# Patient Record
Sex: MALE | Race: WHITE | HISPANIC OR LATINO | Employment: OTHER | URBAN - METROPOLITAN AREA
[De-identification: names, ages, dates, MRNs, and addresses within clinical notes are randomized per-mention and may not be internally consistent; named-entity substitution may affect disease eponyms.]

---

## 2019-01-11 ENCOUNTER — TELEPHONE (OUTPATIENT)
Dept: GASTROENTEROLOGY | Facility: CLINIC | Age: 69
End: 2019-01-11

## 2019-01-11 NOTE — TELEPHONE ENCOUNTER
Spoke with the patient at the  informed we will not be able to see him due to having populytics ins through v netowrk through there kidney transplant program

## 2019-08-23 DIAGNOSIS — K64.9 HEMORRHOIDS, UNSPECIFIED HEMORRHOID TYPE: Primary | ICD-10-CM

## 2019-08-23 NOTE — TELEPHONE ENCOUNTER
Dr Kerr Alert - Patient called refill his hemorrhoid cream  Please call [facility] at [phone] - Any questions please call patient at [phone]

## 2019-10-08 ENCOUNTER — TELEPHONE (OUTPATIENT)
Dept: GASTROENTEROLOGY | Facility: CLINIC | Age: 69
End: 2019-10-08

## 2019-10-08 NOTE — TELEPHONE ENCOUNTER
Dr Abraham Pinto -  Transplant called - requested last Office Note  Sent last 2 office notes and colonoscopy   Faxed to [phone]